# Patient Record
Sex: MALE | Race: WHITE | NOT HISPANIC OR LATINO | Employment: FULL TIME | URBAN - METROPOLITAN AREA
[De-identification: names, ages, dates, MRNs, and addresses within clinical notes are randomized per-mention and may not be internally consistent; named-entity substitution may affect disease eponyms.]

---

## 2024-03-13 ENCOUNTER — OFFICE VISIT (OUTPATIENT)
Dept: ORTHOPEDIC SURGERY | Facility: HOSPITAL | Age: 39
End: 2024-03-13
Payer: COMMERCIAL

## 2024-03-13 ENCOUNTER — HOSPITAL ENCOUNTER (OUTPATIENT)
Dept: RADIOLOGY | Facility: HOSPITAL | Age: 39
Discharge: HOME | End: 2024-03-13
Payer: COMMERCIAL

## 2024-03-13 VITALS — BODY MASS INDEX: 24.25 KG/M2 | HEIGHT: 68 IN | WEIGHT: 160 LBS

## 2024-03-13 DIAGNOSIS — M79.644 PAIN OF RIGHT THUMB: ICD-10-CM

## 2024-03-13 DIAGNOSIS — S63.641A SPRAIN OF ULNAR COLLATERAL LIGAMENT OF METACARPOPHALANGEAL (MCP) JOINT OF RIGHT THUMB, INITIAL ENCOUNTER: ICD-10-CM

## 2024-03-13 PROCEDURE — 73130 X-RAY EXAM OF HAND: CPT | Mod: RT

## 2024-03-13 PROCEDURE — 1036F TOBACCO NON-USER: CPT | Performed by: PHYSICIAN ASSISTANT

## 2024-03-13 PROCEDURE — 99213 OFFICE O/P EST LOW 20 MIN: CPT | Performed by: PHYSICIAN ASSISTANT

## 2024-03-13 PROCEDURE — 73130 X-RAY EXAM OF HAND: CPT | Mod: RIGHT SIDE | Performed by: RADIOLOGY

## 2024-03-13 RX ORDER — TOPIRAMATE 25 MG/1
50 TABLET ORAL DAILY
COMMUNITY

## 2024-03-13 ASSESSMENT — PAIN SCALES - GENERAL: PAINLEVEL_OUTOF10: 6

## 2024-03-13 ASSESSMENT — PAIN - FUNCTIONAL ASSESSMENT: PAIN_FUNCTIONAL_ASSESSMENT: 0-10

## 2024-03-13 ASSESSMENT — PAIN DESCRIPTION - DESCRIPTORS: DESCRIPTORS: SHARP;SORE

## 2024-03-13 NOTE — PROGRESS NOTES
38-year-old left-hand-dominant male presents to Ortho injury walk-in clinic today for evaluation of right hand.  He had an injury that occurred on 3/11/2024 while playing basketball he jammed his right thumb.  He has had persistent discomfort and swelling of the right thumb since injury.  No specific treatment so far.    Patient's self reported past medical history, medications, allergies, surgical history, family and social history as well as a 10 point review of systems has been documented in the new patient intake form and scanned into the patient's electronic medical record. Pertinent findings are documented in the HPI.    Physical Examination Findings:  Constitutional: Appears well-developed and well-nourished.  Head: Normocephalic and atraumatic.  Eyes: Pupils are equal and round.  Cardiovascular: Intact distal pulses.   Respiratory: Effort normal. No respiratory distress.  Neurologic: Alert and oriented to person, place, and time.  Skin: Skin is warm and dry.  Hematologic / Lymphatic: No lymphedema, lymphangitis.  Psychiatric: normal mood and affect. Behavior is normal.   Musculoskeletal: Right hand with some mild swelling at the MCP joint of the right thumb.  No palpable deformity, lesion mild pain with palpation over the ulnar aspect of the MCP joint no pain or instability with stress testing of the collateral ligaments in flexion and extension.  No triggering with IP joint flexion.  Fingers warm and well-perfused.    Review of x-rays taken today of the right thumb reveal no acute fractures or dislocations.  No significant degenerative changes    Impression: Right thumb MCP joint sprain    Plan: We have discussed this injury in detail today.  His collateral ligaments are currently stable.  I have discussed conservative treatment with rest anti-inflammatory medication and bracing he was provided today with a thumb spica Velcro wrist brace.  He may come out for hygiene purposes and light activity he will  avoid any heavy gripping twisting pushing or pulling.  We did discuss that this is an injury that can take several weeks for this to continue to improve.  He may slowly start to advance his activity as he feels comfortable.  We will have him follow-up with our office as needed if he does not notice any improvement of his symptoms.    Patient was prescribed a thumb spica Velcro wrist brace for thumb sprain. The patient has weakness, instability and/or deformity of their right thumb which requires stabilization from this orthosis to improve their function.      Verbal and written instructions for the use, wear schedule, cleaning and application of this item were given.  Patient was instructed that should the brace result in increased pain, decreased sensation, increased swelling, or an overall worsening of their medical condition, to please contact our office immediately.     Orthotic management and training was provided for skin care, modifications due to healing tissues, edema changes, interruption in skin integrity, and safety precautions with the orthosis.    Joan Agosto PA-C  Department of Orthopaedic Surgery  [facility]    Dictation performed with the use of voice recognition software. Syntax and grammatical errors may exist.

## 2024-03-26 ENCOUNTER — OFFICE VISIT (OUTPATIENT)
Dept: ORTHOPEDIC SURGERY | Facility: HOSPITAL | Age: 39
End: 2024-03-26
Payer: COMMERCIAL

## 2024-03-26 ENCOUNTER — HOSPITAL ENCOUNTER (OUTPATIENT)
Dept: RADIOLOGY | Facility: HOSPITAL | Age: 39
Discharge: HOME | End: 2024-03-26
Payer: COMMERCIAL

## 2024-03-26 VITALS — BODY MASS INDEX: 24.25 KG/M2 | HEIGHT: 68 IN | WEIGHT: 160 LBS

## 2024-03-26 DIAGNOSIS — M75.81 ROTATOR CUFF TENDONITIS, RIGHT: Primary | ICD-10-CM

## 2024-03-26 DIAGNOSIS — M25.511 ACUTE PAIN OF RIGHT SHOULDER: ICD-10-CM

## 2024-03-26 PROCEDURE — 99214 OFFICE O/P EST MOD 30 MIN: CPT | Performed by: SPECIALIST/TECHNOLOGIST

## 2024-03-26 PROCEDURE — 1036F TOBACCO NON-USER: CPT | Performed by: SPECIALIST/TECHNOLOGIST

## 2024-03-26 PROCEDURE — 73030 X-RAY EXAM OF SHOULDER: CPT | Mod: RIGHT SIDE | Performed by: RADIOLOGY

## 2024-03-26 PROCEDURE — 73030 X-RAY EXAM OF SHOULDER: CPT | Mod: RT

## 2024-03-26 ASSESSMENT — PAIN DESCRIPTION - DESCRIPTORS: DESCRIPTORS: SHARP

## 2024-03-26 ASSESSMENT — PAIN - FUNCTIONAL ASSESSMENT: PAIN_FUNCTIONAL_ASSESSMENT: 0-10

## 2024-03-26 ASSESSMENT — PAIN SCALES - GENERAL: PAINLEVEL_OUTOF10: 6

## 2024-03-26 NOTE — PROGRESS NOTES
"Chief Complaint: Pain of the Right Shoulder    HPI:  Dominick Alcazar is a 38 y.o. left-hand-dominant male senting to the orthopedic walk-in clinic with right shoulder pain occurring on 3/25/2024 when he rolled over in bed awkwardly and felt a \"pop\".  Today, he reports a sharp sensation over the posterior and anterior shoulder that worsens with overhead movements, abduction and reaching behind him.  He has not done anything for the pain since initial onset of injury.  He denies prior right shoulder injuries.  He denies any numbness or tingling into the right upper extremity.  He presents for treatment recommendations.    Objective     ROS:  Constitutional: No fever, no chills, not feeling tired, no recent weight gain and no recent weight loss  ENT: No nosebleeds  Cardiovascular: No chest pain  Respiratory: No shortness of breath and no cough  Gastrointestinal: No abdominal pain, no nausea, no diarrhea, and no vomiting  Musculoskeletal: Positive for right shoulder pain  Integumentary: No rashes and no skin lesions  Neurological: No headache  Psychiatric: No sleep disturbances no depression  Endocrine: No muscle weakness and no muscle cramps  Hematologic/lymphatic: No swelling glands and no tendency for easy bruising    History reviewed. No pertinent past medical history.     History reviewed. No pertinent surgical history.     Social Connections: Not on file          Physical Exam:  General appearance: WN, WD male, in no acute distress  Skin: No rashes, lesions or wounds  Head: Normocephalic, no evidence of trauma  Eye: EOMI, conjunctiva clear, no discharge  ENT: Nares patent  Neck: No abnormal contour, tracheal midline  Chest/lungs: No respiratory distress, speaking in complete sentences  Musculoskeletal: Tender to palpation over the posterior lateral shoulder.  Full and symmetric shoulder flexion and abduction with pain at the end range.  4/5 manual muscle testing empty can, abduction, belly press due to pain " right versus left.  Painful Antelope's.  Negative Soares Magen.  Negative Neer.  Full and pain-free cervical range of motion.  Nontender cervical spine.  No muscle wasting, rigidity    Neurological: A&O x3, no focal deficits, intact bilateral UE  Psych: normal affect, mood, appearance      Image Results:  X-rays taken on 3/26/2024 were reviewed with the patient in the office today and reveal no acute fractures or dislocations.      Assessment/Plan   Encounter Diagnoses:  Rotator cuff tendonitis, right    Acute pain of right shoulder    Orders Placed This Encounter    XR shoulder right 2+ views    Referral to Physical Therapy       Patient I discussed his clinical presentation and physical exam findings consistent with right rotator cuff tendinitis.  We agreed upon conservative management at this time.  He will begin to take 800 mg of Advil 3 times a day with food and can take 2 extra strength 500 mg Tylenol up to 3 times a day as needed for pain.  He will begin to ice his shoulder 15-20 minutes at a time 2-3 times per day.  He was provided with a referral to physical therapy.  We did discuss the possible use of corticosteroid injection in the current subacromial space to help with inflammation if these interventions do not improve his symptoms.  He will follow-up in the next 3-4 weeks if his symptoms persist or worsens.    ** This office note was dictated using Dragon voice to text software and was not proofread for spelling or grammatical errors **